# Patient Record
Sex: MALE | Race: WHITE
[De-identification: names, ages, dates, MRNs, and addresses within clinical notes are randomized per-mention and may not be internally consistent; named-entity substitution may affect disease eponyms.]

---
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
## 2023-04-04 ENCOUNTER — HOSPITAL ENCOUNTER (OUTPATIENT)
Dept: HOSPITAL 46 - DS | Age: 18
Discharge: HOME | End: 2023-04-04
Attending: OTOLARYNGOLOGY
Payer: COMMERCIAL

## 2023-04-04 VITALS — HEIGHT: 69 IN | BODY MASS INDEX: 28.94 KG/M2 | WEIGHT: 195.4 LBS

## 2023-04-04 DIAGNOSIS — J35.01: Primary | ICD-10-CM

## 2023-04-04 PROCEDURE — 0CTPXZZ RESECTION OF TONSILS, EXTERNAL APPROACH: ICD-10-PCS | Performed by: OTOLARYNGOLOGY

## 2023-04-04 NOTE — OR
[facility]
                                    [address] [place], [place]  [address]
_________________________________________________________________________________________
                                                                 Signed   
 
 
DATE OF OPERATION:
04/04/2023
 
SURGEON:
Odin Stroud MD
 
LOCATION:
[facility].
 
PREOPERATIVE DIAGNOSES:
Chronic tonsillitis with tonsillar hypertrophy, sleep-disordered breathing.
 
POSTOPERATIVE DIAGNOSES:
Chronic tonsillitis with tonsillar hypertrophy, sleep-disordered breathing.
 
PROCEDURE:
Tonsillectomy.
 
ANESTHESIA:
General orotracheal, CRNA, Emmanuel.
 
PREOPERATIVE HISTORY:
Sonya is a 18-year-old young man with chronic tonsillitis, tonsillar hypertrophy and
sleep-disordered breathing, taken to the operating room for the above-mentioned
procedures. 
 
OPERATIVE PROCEDURE AND FINDINGS:
After informed consent, the patient was taken to the operating room, placed in the
supine position where general orotracheal anesthesia was induced. The patient and
procedure were verified.  The patient was repositioned. McIvor mouth gag placed into
suspension.  Headlight exam of the pharynx showed markedly hypertrophic obstructive
tonsils tonsillolithic cryptic. Left tonsil was grasped with a tenaculum, retracted
medially and removed from its fossa with mucosal sparing incisions with Coblation.  The
field was dry after the procedure. Same procedure on the right tonsil. Tonsils were sent
to pathology.  Mouth gag was released for several minutes.  Reinspection showed no
bleeding points.  The pharynx was suctioned clear of blood and secretions.  Mouth gag
was removed.  The patient was awakened, extubated, and transported to recovery room in
good condition.  No complications. 
 
BLOOD LOSS:
Minimal.
 
 
    Electronically Signed By: ODIN STROUD MD  04/04/23 1157
_________________________________________________________________________________________
PATIENT NAME:     SONYA BLACKMAN                    
MEDICAL RECORD #: [id number]            OPERATIVE REPORT              
          ACCT #: [id number]  
DATE OF BIRTH:   [birth year]            REPORT #: [id number]      
PHYSICIAN:        ODIN STROUD MD              
PCP:              MARY JANE TANNER MD           
REPORT IS CONFIDENTIAL AND NOT TO BE RELEASED WITHOUT AUTHORIZATION
 
 
                                  [facility]
                                    [address]
                                  [place], [place]  [address]
_________________________________________________________________________________________
                                                                 Signed   
 
 
SPECIMEN:
To pathology.
 
DRAINS:
No drains.
 
 
 
            ________________________________________
            Odin Stroud MD GC/MODL
Job #:  [id number]
DD:  04/04/2023 09:23:36
DT:  04/04/2023 10:04:29
 
 
Copies:                                
~
 
 
 
 
 
 
 
 
 
 
 
 
 
 
 
 
 
 
 
 
 
 
 
    Electronically Signed By: ODIN STROUD MD  04/04/23 1157
_________________________________________________________________________________________
PATIENT NAME:     SONYA BLACKMAN                    
MEDICAL RECORD #: [id number]            OPERATIVE REPORT              
          ACCT #: [id number]  
DATE OF BIRTH:   [birth year]            REPORT #: [id number]      
PHYSICIAN:        ODIN STROUD MD              
PCP:              MARY JANE TANNER MD           
REPORT IS CONFIDENTIAL AND NOT TO BE RELEASED WITHOUT AUTHORIZATION

## 2023-04-05 NOTE — PATH
[facility]
                                    [address]
                                  [place], [place]  [address]
_________________________________________________________________________________________
                                                                 Signed   
 
 
 
SPECIMEN(S): A BILATERAL TONSILS, GROSS ONLY
 
SPECIMEN SOURCE:
A. BILATERAL TONSILS, GROSS ONLY
 
CLINICAL HISTORY:
Chronic tonsillitis / sleep apnea.
 
FINAL PATHOLOGIC DIAGNOSIS:
Bilateral tonsils, gross only:
-  Two palatine tonsils, 3.7 x 2.0 x 1.5 cm and 3.9 x 2.1 x 1.2 cm.
JVR:[facility]:C3NR
 
GROSS DESCRIPTION:
The specimen, labeled and designated "Armaan, bilateral tonsils," is received 
in formalin and consists of two undesignated palatine tonsils. The first tonsil 
is 3.7 x 2.0 x 1.5 cm. The mucosal surface 
is pink-tan smooth with areas of folds. Cut sections reveal a pink homogeneous 
cut surface, with the usual crypt-like architecture.  The second tonsil is 3.9 
x 2.1 x 1.2 cm. The mucosal surface is 
pink-tan smooth with areas of folds. Cut sections reveal a pink homogeneous cut 
surface, with the usual crypt-like architecture. Gross examination only. 
JS  (under the direct supervision of a pathologist)
The Gross Description was prepared using a voice recognition system. The report 
was reviewed for accuracy; however, sound-alike word errors, addition and/or 
deletions may occur. If there is any 
question about this report, please contact Client Services.
 
PERFORMING LABORATORY:
The technical component was performed by [facility], [address] [place] [id number] ([id number]# [id number]).  Professional interpretation was 
performed by [facility], 
[address] [place] [id number] (CLIA#: [id number]).
 
Diagnostician:  Clemente Norwood MD
Pathologist
Electronically Signed 04/05/2023
 
 
 
 
 
                                                                                    
_________________________________________________________________________________________
PATIENT NAME:     SONYA BLACKMAN                    
MEDICAL RECORD #: [id number]            PATHOLOGY                     
          ACCT #: [id number]       ACCESSION #: [id number]     
DATE OF BIRTH:   [birth year]            REPORT #: [id number]       
PHYSICIAN:        MIRIAM GALVEZ              
PCP:              MARY JANE TANNER MD           
REPORT IS CONFIDENTIAL AND NOT TO BE RELEASED WITHOUT AUTHORIZATION

## 2023-11-19 ENCOUNTER — HOSPITAL ENCOUNTER (EMERGENCY)
Dept: HOSPITAL 46 - ED | Age: 18
Discharge: HOME | End: 2023-11-19
Payer: COMMERCIAL

## 2023-11-19 VITALS — DIASTOLIC BLOOD PRESSURE: 74 MMHG | SYSTOLIC BLOOD PRESSURE: 127 MMHG

## 2023-11-19 VITALS — WEIGHT: 211.86 LBS | HEIGHT: 68 IN | BODY MASS INDEX: 32.11 KG/M2

## 2023-11-19 DIAGNOSIS — Z11.52: ICD-10-CM

## 2023-11-19 DIAGNOSIS — B34.9: Primary | ICD-10-CM

## 2023-11-19 LAB
FLUBV RNA RESP QL NAA+PROBE: NEGATIVE
RSV RNA ISLT QL NAA+PROBE: NEGATIVE

## 2023-11-19 PROCEDURE — U0002 COVID-19 LAB TEST NON-CDC: HCPCS

## 2023-11-19 PROCEDURE — C9803 HOPD COVID-19 SPEC COLLECT: HCPCS

## 2023-11-22 ENCOUNTER — HOSPITAL ENCOUNTER (EMERGENCY)
Dept: HOSPITAL 46 - ED | Age: 18
Discharge: HOME | End: 2023-11-22
Payer: COMMERCIAL

## 2023-11-22 VITALS — SYSTOLIC BLOOD PRESSURE: 116 MMHG | DIASTOLIC BLOOD PRESSURE: 75 MMHG

## 2023-11-22 VITALS — HEIGHT: 68 IN | WEIGHT: 206.79 LBS | BODY MASS INDEX: 31.34 KG/M2

## 2023-11-22 DIAGNOSIS — Z79.899: ICD-10-CM

## 2023-11-22 DIAGNOSIS — N13.2: Primary | ICD-10-CM

## 2023-11-22 LAB
ALBUMIN SERPL-MCNC: 4.1 G/DL (ref 3.4–5)
ALBUMIN/GLOB SERPL: 1.28 {RATIO} (ref 1.1–2.4)
ALP SERPL-CCNC: 79 U/L (ref 46–116)
ALT SERPL W P-5'-P-CCNC: 64 U/L (ref 14–59)
ANION GAP SERPL CALCULATED.4IONS-SCNC: 13.8 MMOL/L (ref 7–21)
AST SERPL-CCNC: 23 U/L (ref 15–37)
BASOPHILS NFR BLD AUTO: 0.8 % (ref 0–2)
BUN SERPL-MCNC: 13 MG/DL (ref 7–18)
BUN/CREAT SERPL: 13 (ref 6–28.6)
CALCIUM SERPL-MCNC: 9 MG/DL (ref 8.5–10.1)
CHLORIDE SERPL-SCNC: 101 MMOL/L (ref 98–107)
CO2 SERPL-SCNC: 27 MMOL/L (ref 21–32)
DEPRECATED RDW RBC AUTO: 13.1 FL (ref 10.5–15)
EGFRCR SERPLBLD CKD-EPI 2021: 111 ML/MIN (ref 60–?)
EOSINOPHIL NFR BLD AUTO: 4.7 % (ref 0–6)
GLOBULIN SER-MCNC: 3.2 G/DL (ref 1.8–3.5)
HCT VFR BLD AUTO: 44 % (ref 35–50)
HGB BLD-MCNC: 15 G/DL (ref 12–18)
LYMPHOCYTES NFR BLD AUTO: 37.8 % (ref 24–44)
MCH RBC QN AUTO: 30.3 PG (ref 27–36)
MCHC RBC AUTO-ENTMCNC: 34.1 G/DL (ref 30–36)
MCV RBC AUTO: 89 FL (ref 81–99)
MONOCYTES NFR BLD AUTO: 8.8 % (ref 0–12)
NEUTROPHILS NFR BLD AUTO: 47.9 % (ref 39–80)
PLATELET # BLD AUTO: 286 K/UL (ref 140–440)
POTASSIUM SERPL-SCNC: 3.8 MMOL/L (ref 3.5–5.1)
PROT SERPL-MCNC: 7.3 G/DL (ref 6.4–8.2)
RBC # BLD AUTO: 4.95 M/UL (ref 4.3–5.7)

## 2023-11-22 PROCEDURE — A9270 NON-COVERED ITEM OR SERVICE: HCPCS

## 2023-11-22 NOTE — XMS
PreManage Notification: SONYA BLACKMAN MRN:[id number]
 
Security Information
 
Security Events
1 event(s) in the past 18 months
Most recent security events:
 
Elopement at [facility] 09/19/2022 14:42
  -    Patient eloped before treatment completed.
  -    Patient with suicidal and/or homicidal ideations eloped.
  -    Patient eloped with IV in place.
Details:
    PATIENT LWBS
 
 
 
CRITERIA MET
------------
- [facility] - 2 Visits in 30 Days
 
 
CARE PROVIDERS
-------------------------------------------------------------------------------------
-[place]-            Dentist: General Practice     [facility]
 
PHONE: [phone]
-------------------------------------------------------------------------------------
[facility]     Current
 
PHONE: Unknown
-------------------------------------------------------------------------------------
 
Piedad has no Care Guidelines for this patient.
 
APURVA. VISIT COUNT (12 MO.)
-------------------------------------------------------------------------------------
4 [facility]
-------------------------------------------------------------------------------------
TOTAL 4
-------------------------------------------------------------------------------------
NOTE: Visits indicate total known visits.
 
ED/UCC VISIT TRACKING (12 MO.)
-------------------------------------------------------------------------------------
11/22/2023 06:05
[facility] [place]
 
TYPE: Emergency
 
COMPLAINT:
- L FLANK TO ABD PAIN
-------------------------------------------------------------------------------------
11/19/2023 17:30
[facility] [place]
 
TYPE: Emergency
 
 
COMPLAINT:
- COLD SYMPTOMS
 
DIAGNOSES:
- Encounter for screening for COVID-19
- Other specified symptoms and signs involving the circulatory and respiratory
systems
- Viral infection, unspecified
-------------------------------------------------------------------------------------
09/30/2023 22:59
[facility] [place]
 
TYPE: Emergency
 
COMPLAINT:
- CP
 
DIAGNOSES:
- Chest pain, unspecified
- Other chest pain
-------------------------------------------------------------------------------------
03/12/2023 17:51
[facility] [place]
 
TYPE: Emergency
 
COMPLAINT:
- SORE THROAT
 
DIAGNOSES:
- Acute pharyngitis, unspecified
-------------------------------------------------------------------------------------
 
 
INPATIENT VISIT TRACKING (12 MO.)
No inpatient visits to display in this time frame
 
[email or web address]

## 2024-09-26 ENCOUNTER — HOSPITAL ENCOUNTER (EMERGENCY)
Dept: HOSPITAL 46 - ED | Age: 19
Discharge: HOME | End: 2024-09-26
Payer: COMMERCIAL

## 2024-09-26 VITALS — BODY MASS INDEX: 31.98 KG/M2 | WEIGHT: 210.98 LBS | HEIGHT: 68 IN

## 2024-09-26 VITALS — DIASTOLIC BLOOD PRESSURE: 82 MMHG | SYSTOLIC BLOOD PRESSURE: 140 MMHG

## 2024-09-26 DIAGNOSIS — R11.10: Primary | ICD-10-CM

## 2024-09-26 LAB
ALBUMIN SERPL-MCNC: 4.3 G/DL (ref 3.4–5)
ALBUMIN/GLOB SERPL: 1.43 {RATIO} (ref 1.1–2.4)
ALP SERPL-CCNC: 78 U/L (ref 46–116)
ALT SERPL W P-5'-P-CCNC: 89 U/L (ref 14–59)
ANION GAP SERPL CALCULATED.4IONS-SCNC: 12.2 MMOL/L (ref 7–21)
AST SERPL-CCNC: 28 U/L (ref 15–37)
BASOPHILS NFR BLD AUTO: 0.8 % (ref 0–2)
BUN SERPL-MCNC: 16 MG/DL (ref 7–18)
BUN/CREAT SERPL: 19.27 (ref 6–28.6)
CALCIUM SERPL-MCNC: 9.3 MG/DL (ref 8.5–10.1)
CHLORIDE SERPL-SCNC: 102 MMOL/L (ref 98–107)
CO2 SERPL-SCNC: 29 MMOL/L (ref 21–32)
DEPRECATED RDW RBC AUTO: 13.3 FL (ref 10.5–15)
EGFRCR SERPLBLD CKD-EPI 2021: 128 ML/MIN (ref 60–?)
EOSINOPHIL NFR BLD AUTO: 2.5 % (ref 0–6)
GLOBULIN SER-MCNC: 3 G/DL (ref 1.8–3.5)
HCT VFR BLD AUTO: 44.1 % (ref 35–50)
HGB BLD-MCNC: 15.1 G/DL (ref 12–18)
HGB UR QL STRIP: NEGATIVE
KETONES UR QL STRIP: NEGATIVE
LEUKOCYTE ESTERASE UR QL STRIP: NEGATIVE
LYMPHOCYTES NFR BLD AUTO: 31.7 % (ref 24–44)
MAGNESIUM SERPL-MCNC: 1.7 MG/DL (ref 1.8–2.4)
MCH RBC QN AUTO: 29.9 PG (ref 27–36)
MCHC RBC AUTO-ENTMCNC: 34.1 G/DL (ref 30–36)
MCV RBC AUTO: 87.7 FL (ref 81–99)
MONOCYTES NFR BLD AUTO: 8 % (ref 0–12)
NEUTROPHILS NFR BLD AUTO: 57 % (ref 39–80)
NITRITE UR QL STRIP: NEGATIVE
PLATELET # BLD AUTO: 291 K/UL (ref 140–440)
POTASSIUM SERPL-SCNC: 4.2 MMOL/L (ref 3.5–5.1)
PROT SERPL-MCNC: 7.3 G/DL (ref 6.4–8.2)
RBC # BLD AUTO: 5.03 M/UL (ref 4.3–5.7)